# Patient Record
Sex: MALE | Race: WHITE | URBAN - METROPOLITAN AREA
[De-identification: names, ages, dates, MRNs, and addresses within clinical notes are randomized per-mention and may not be internally consistent; named-entity substitution may affect disease eponyms.]

---

## 2019-04-15 ENCOUNTER — HOSPITAL ENCOUNTER (EMERGENCY)
Facility: CLINIC | Age: 29
Discharge: HOME | End: 2019-04-15
Attending: EMERGENCY MEDICINE | Admitting: EMERGENCY MEDICINE

## 2019-04-15 ENCOUNTER — APPOINTMENT (OUTPATIENT)
Dept: GENERAL RADIOLOGY | Facility: CLINIC | Age: 29
End: 2019-04-15
Attending: EMERGENCY MEDICINE

## 2019-04-15 VITALS
RESPIRATION RATE: 16 BRPM | SYSTOLIC BLOOD PRESSURE: 117 MMHG | OXYGEN SATURATION: 100 % | TEMPERATURE: 97.9 F | DIASTOLIC BLOOD PRESSURE: 55 MMHG

## 2019-04-15 DIAGNOSIS — R04.2 HEMOPTYSIS: ICD-10-CM

## 2019-04-15 DIAGNOSIS — J06.9 URI, ACUTE: ICD-10-CM

## 2019-04-15 PROCEDURE — 71046 X-RAY EXAM CHEST 2 VIEWS: CPT

## 2019-04-15 PROCEDURE — 99283 EMERGENCY DEPT VISIT LOW MDM: CPT

## 2019-04-15 RX ORDER — BENZONATATE 200 MG/1
200 CAPSULE ORAL 3 TIMES DAILY PRN
Qty: 30 CAPSULE | Refills: 0 | Status: SHIPPED | OUTPATIENT
Start: 2019-04-15

## 2019-04-15 ASSESSMENT — ENCOUNTER SYMPTOMS
NAUSEA: 0
DIAPHORESIS: 0
CHILLS: 0
ABDOMINAL PAIN: 0
DIARRHEA: 0
COUGH: 1
UNEXPECTED WEIGHT CHANGE: 0
VOMITING: 0
FEVER: 0

## 2019-04-15 NOTE — ED TRIAGE NOTES
Pt presents with ongoing cough for about a month. Noticed blood in sputum this morning with otherwise clear secretions. Noticed bleeding after an aggressive coughing episode. Reports mild chest pain with coughing. Denies fevers, endorses night sweats and chills. ABCs intact, A&Ox4.

## 2019-04-15 NOTE — ED PROVIDER NOTES
History     Chief Complaint:  Hemoptysis      HPI   Tevin Philippe is a 28 year old male who presents with hemoptysis. The patient reports that he has had a cough for 3-4 weeks. He states that it has been improving but continues to be persistant. Today he noticed blood in his napkin after he coughed so he decided to come into the ED for further evaluation. His wife and daughter both have been sick but are now asymptomatic. He was born internationally and does not believe he was screened for tuberculosis in 1997 when he moved to the [place] He denies fever, chills, abdominal pain, nausea, vomiting, diarrhea, chest pain, or nose bleed .     Allergies:  NKDA    Medications:    The patient is currently on no regular medications.    Past Medical History:     The patient is currently on no regular medications.    Past Surgical History:    History reviewed. No pertinent past surgical history.    Family History:    History reviewed. No pertinent family history.    Social History:  Negative for alcohol use.  Negative for tobacco use.    Review of Systems   Constitutional: Negative for chills, diaphoresis, fever and unexpected weight change.   Respiratory: Positive for cough (Hemoptysis ).    Cardiovascular: Negative for chest pain.   Gastrointestinal: Negative for abdominal pain, diarrhea, nausea and vomiting.   All other systems reviewed and are negative.        Physical Exam     Patient Vitals for the past 24 hrs:   BP Temp Temp src Heart Rate Resp SpO2   04/15/19 1351 124/76 97.9  F (36.6  C) Oral 67 16 97 %         Physical Exam    General: Patient is alert and interactive when I enter the room  Head:  The scalp, face, and head appear normal  Eyes:  The pupils are equal, round, and reactive to light    Conjunctivae and sclerae are normal  ENT:    External acoustic canals are normal    The oropharynx is normal without erythema.     Uvula is in the midline  Neck:  Normal range of motion  CV:  Regular rate. S1/S2. No  murmurs.   Resp:  Lungs are clear without wheezes or rales. No distress  GI:  Abdomen is soft, no rigidity, guarding, or rebound    No distension. No tenderness to palpation in any quadrant.     MS:  Normal tone. Joints grossly normal without effusions.     No asymmetric leg swelling, calf or thigh tenderness.      Normal motor assessment of all extremities.  Skin:  No rash or lesions noted. Normal capillary refill noted  Neuro: Speech is normal and fluent. Face is symmetric.     Moving all extremities well.   Psych:  Awake. Alert.  Normal affect.  Appropriate interactions.  Lymph: No anterior cervical lymphadenopathy noted      Emergency Department Course   Imaging:  XR Chest 2 views:   The lungs are clear. No focal pulmonary opacities. Heart  and mediastinum are unremarkable. No acute cardiopulmonary  abnormalities. As per radiology.     Emergency Department Course:  Nursing notes and vitals reviewed. (1437) I performed an exam of the patient as documented above.     The patient was sent for a Chest XR while in the emergency department, findings above.     (1521) I rechecked the patient and discussed the results of his workup thus far.     Findings and plan explained to the Patient. Patient discharged home with instructions regarding supportive care, medications, and reasons to return. The importance of close follow-up was reviewed.     I personally reviewed the imaging results with the Patient and answered all related questions prior to discharge.  Impression & Plan    Medical Decision Making:    Tevin Philippe is a 28 year old male who presents for evaluation of cough for three weeks ad now coughing a small amount of blood in sputum. Broad differential of course were considered. [place] has no personal history of tuberculosis. His chest XR was negative and without associated symptoms I do not think I would keep him to do serial AF[employer] sputums. The patient was counseled to follow up  with primary provider for recheck. I will start him on Tessalon pearls. There are no signs of pneumonia or any other concerning findings. He may have a bronchitis due to his URI.     Critical Care time:  none    Diagnosis:    ICD-10-CM    1. URI, acute J06.9    2. Hemoptysis R04.2        Disposition:  discharged to home    Discharge Medications:     Medication List      Started    benzonatate 200 MG capsule  Commonly known as:  TESSALON  200 mg, Oral, 3 TIMES DAILY PRN            Scribe Disclosure:  I, Sadaf Trejo, am serving as a scribe on 4/15/2019 at 2:37 PM to personally document services performed by Beau Swenson MD based on my observations and the provider's statements to me.       Sadaf Trejo  4/15/2019   [facility] EMERGENCY DEPARTMENT       Beau Swenson MD  04/18/19 7937

## 2019-04-15 NOTE — ED AVS SNAPSHOT
[facility] Emergency Department  [address]  [facility] [address]  Phone:  [phone]  Fax:  [phone]                                    Tevin Philippe   MRN: [id number]    Department:  [facility] Emergency Department   Date of Visit:  4/15/2019           After Visit Summary Signature Page    I have received my discharge instructions, and my questions have been answered. I have discussed any challenges I see with this plan with the nurse or doctor.    ..........................................................................................................................................  Patient/Patient Representative Signature      ..........................................................................................................................................  Patient Representative Print Name and Relationship to Patient    ..................................................               ................................................  Date                                   Time    ..........................................................................................................................................  Reviewed by Signature/Title    ...................................................              ..............................................  Date                                               Time          22EPIC Rev 08/18

## 2024-09-10 ENCOUNTER — APPOINTMENT (OUTPATIENT)
Dept: URBAN - METROPOLITAN AREA CLINIC 253 | Age: 34
Setting detail: DERMATOLOGY
End: 2024-09-10

## 2024-09-10 VITALS — RESPIRATION RATE: 14 BRPM | WEIGHT: 152 LBS | HEIGHT: 72 IN

## 2024-09-10 PROBLEM — D23.39 OTHER BENIGN NEOPLASM OF SKIN OF OTHER PARTS OF FACE: Status: ACTIVE | Noted: 2024-09-10

## 2024-09-10 PROBLEM — D23.4 OTHER BENIGN NEOPLASM OF SKIN OF SCALP AND NECK: Status: ACTIVE | Noted: 2024-09-10

## 2024-09-10 PROCEDURE — OTHER MIPS QUALITY: OTHER

## 2024-09-10 PROCEDURE — OTHER COUNSELING: OTHER

## 2024-09-10 PROCEDURE — 99203 OFFICE O/P NEW LOW 30 MIN: CPT

## 2024-09-10 PROCEDURE — OTHER ADDITIONAL NOTES: OTHER

## 2024-09-10 NOTE — PROCEDURE: ADDITIONAL NOTES
Detail Level: Simple
Render Risk Assessment In Note?: no
Additional Notes: This is raised and gets traumatized when grooming and shaving his head. Discussed the option of shave removal versus surgical elliptical excision. Patient elects to have it excised. He will plan to make a 30 minute appointment for this.

## 2024-09-10 NOTE — HPI: SKIN LESION
Is This A New Presentation, Or A Follow-Up?: Skin Lesion
Additional History: He notes a raised lesion that has been present since he was a teenager. This occasionally gets cut and will bleed when shaving his head.

## 2024-09-19 ENCOUNTER — APPOINTMENT (OUTPATIENT)
Dept: URBAN - METROPOLITAN AREA CLINIC 253 | Age: 34
Setting detail: DERMATOLOGY
End: 2024-09-19

## 2024-09-19 VITALS — WEIGHT: 152 LBS | RESPIRATION RATE: 14 BRPM | HEIGHT: 72 IN

## 2024-09-19 DIAGNOSIS — D22 MELANOCYTIC NEVI: ICD-10-CM

## 2024-09-19 PROBLEM — D22.4 MELANOCYTIC NEVI OF SCALP AND NECK: Status: ACTIVE | Noted: 2024-09-19

## 2024-09-19 PROCEDURE — OTHER MIPS QUALITY: OTHER

## 2024-09-19 PROCEDURE — OTHER COUNSELING: OTHER

## 2024-09-19 PROCEDURE — 11421 EXC H-F-NK-SP B9+MARG 0.6-1: CPT

## 2024-09-19 PROCEDURE — 12031 INTMD RPR S/A/T/EXT 2.5 CM/<: CPT

## 2024-09-19 PROCEDURE — OTHER EXCISION: OTHER

## 2024-09-19 ASSESSMENT — LOCATION DETAILED DESCRIPTION DERM: LOCATION DETAILED: RIGHT SUPERIOR FRONTAL SCALP

## 2024-09-19 ASSESSMENT — LOCATION ZONE DERM: LOCATION ZONE: SCALP

## 2024-09-19 ASSESSMENT — LOCATION SIMPLE DESCRIPTION DERM: LOCATION SIMPLE: SCALP

## 2024-09-19 NOTE — HPI: SKIN LESION
Is This A New Presentation, Or A Follow-Up?: Skin Lesion
Additional History: Patient presents for removal of an epidermal nevus.

## 2024-09-19 NOTE — PROCEDURE: EXCISION
